# Patient Record
Sex: FEMALE | Race: WHITE | NOT HISPANIC OR LATINO | Employment: UNEMPLOYED | URBAN - METROPOLITAN AREA
[De-identification: names, ages, dates, MRNs, and addresses within clinical notes are randomized per-mention and may not be internally consistent; named-entity substitution may affect disease eponyms.]

---

## 2018-01-22 DIAGNOSIS — M54.9 BACK PAIN, UNSPECIFIED BACK LOCATION, UNSPECIFIED BACK PAIN LATERALITY, UNSPECIFIED CHRONICITY: Primary | ICD-10-CM

## 2018-01-22 RX ORDER — GABAPENTIN 300 MG/1
300 CAPSULE ORAL 2 TIMES DAILY
COMMUNITY
Start: 2017-12-07 | End: 2018-01-22 | Stop reason: SDUPTHER

## 2018-01-22 RX ORDER — GABAPENTIN 300 MG/1
300 CAPSULE ORAL 2 TIMES DAILY
Qty: 60 CAPSULE | Refills: 3 | Status: SHIPPED | OUTPATIENT
Start: 2018-01-22 | End: 2018-05-07

## 2018-01-22 NOTE — TELEPHONE ENCOUNTER
----- Message from Katelynn Chaidez sent at 1/18/2018 12:49 PM CST -----  Contact: Will ()  pts  called requesting a refill of her neurontin. She uses the [facility] in [place] ([address] across from [facility])

## 2018-04-23 PROBLEM — Z82.49 FAMILY HISTORY OF HEART DISEASE: Status: ACTIVE | Noted: 2018-04-23

## 2018-04-23 PROBLEM — R53.82 CHRONIC FATIGUE: Status: ACTIVE | Noted: 2018-04-23

## 2018-04-23 PROBLEM — K59.04 CHRONIC IDIOPATHIC CONSTIPATION: Status: ACTIVE | Noted: 2018-04-23

## 2018-05-07 ENCOUNTER — OFFICE VISIT (OUTPATIENT)
Dept: INTERNAL MEDICINE | Facility: CLINIC | Age: 38
End: 2018-05-07

## 2018-05-07 VITALS
SYSTOLIC BLOOD PRESSURE: 126 MMHG | WEIGHT: 194.81 LBS | HEIGHT: 67 IN | TEMPERATURE: 98 F | BODY MASS INDEX: 30.57 KG/M2 | RESPIRATION RATE: 19 BRPM | HEART RATE: 130 BPM | DIASTOLIC BLOOD PRESSURE: 88 MMHG | OXYGEN SATURATION: 99 %

## 2018-05-07 DIAGNOSIS — F32.9 REACTIVE DEPRESSION: Primary | ICD-10-CM

## 2018-05-07 DIAGNOSIS — B27.90 CHRONIC EBV INFECTION: ICD-10-CM

## 2018-05-07 DIAGNOSIS — R00.0 TACHYCARDIA: ICD-10-CM

## 2018-05-07 DIAGNOSIS — L30.9 ECZEMA, UNSPECIFIED TYPE: ICD-10-CM

## 2018-05-07 DIAGNOSIS — Z23 NEED FOR HPV VACCINATION: ICD-10-CM

## 2018-05-07 DIAGNOSIS — E55.9 VITAMIN D DEFICIENCY: ICD-10-CM

## 2018-05-07 PROCEDURE — 99214 OFFICE O/P EST MOD 30 MIN: CPT | Mod: ,,, | Performed by: NURSE PRACTITIONER

## 2018-05-07 RX ORDER — DULOXETIN HYDROCHLORIDE 60 MG/1
120 CAPSULE, DELAYED RELEASE ORAL DAILY
COMMUNITY
Start: 2016-09-16 | End: 2018-05-07 | Stop reason: SDUPTHER

## 2018-05-07 RX ORDER — BACLOFEN 10 MG/1
1 TABLET ORAL 2 TIMES DAILY
COMMUNITY
Start: 2017-09-25

## 2018-05-07 RX ORDER — DULOXETIN HYDROCHLORIDE 60 MG/1
120 CAPSULE, DELAYED RELEASE ORAL DAILY
Qty: 180 CAPSULE | Refills: 1 | Status: SHIPPED | OUTPATIENT
Start: 2018-05-07

## 2018-05-07 RX ORDER — BETAMETHASONE DIPROPIONATE 0.5 MG/G
CREAM TOPICAL 2 TIMES DAILY
Qty: 15 G | Refills: 0 | Status: SHIPPED | OUTPATIENT
Start: 2018-05-07 | End: 2018-05-17

## 2018-05-07 RX ORDER — GABAPENTIN 300 MG/1
1 CAPSULE ORAL 2 TIMES DAILY
COMMUNITY
Start: 2017-09-25

## 2018-05-07 NOTE — PROGRESS NOTES
"    SUBJECTIVE:      Patient ID: Ramila Sotelo is a 37 y.o. female.    Chief Complaint: Follow-up (meds and labs - 8/17 last office visit would like to discuss adipex. has been drinking energy drinks for energy. On cymbalta for EBV)    Follow up for routine check, med refills, wants to discuss weight loss options, has been on adipex in past - hasn't worked as well each consecutive time she's tried it. Admits to poor diet, doesn't eat often, works long hours, drinks energy drinks regularly. Wants to try something OTC    Has EBV flares when stressed. C/o itchy, dry rash R inner thigh - comes/goes last several years, tested for lupus - states Dr. Robbins didn't find anything, denies pain/burning/blistering, continues to show up in the same place, no where else on body, no pets in home, no OTC treatments have helped, has never seen derm for problem, friend insists it is lupus rash.    HO vitamin D deficiency - has been unusually tired lately, not sure if her EBV is "flaring" or D may be low, states she "perks up" & feels better when she's been outside for a while      Rash   This is a recurrent problem. The current episode started 1 to 4 weeks ago. The problem has been gradually improving since onset. The affected locations include the left upper leg. The rash is characterized by itchiness and dryness. She was exposed to nothing. Associated symptoms include fatigue. Pertinent negatives include no anorexia, congestion, cough, diarrhea, eye pain, facial edema, fever, joint pain, nail changes, rhinorrhea, shortness of breath, sore throat or vomiting. Past treatments include topical steroids, moisturizer and anti-itch cream (anti-fungals). The treatment provided mild relief. There is no history of allergies, asthma, eczema or varicella.       Past Surgical History:   Procedure Laterality Date    APPENDECTOMY  2011    Cesarian section  07/16/2004 12/27/2001    Dilatation and curretage  2009     Family History   Problem " Relation Age of Onset    Diabetes Mother     Hypertension Mother     Heart disease Father     Hypertension Father     Kidney disease Father     Heart attacks under age 50 Father     Heart disease Maternal Grandmother     Kidney disease Maternal Grandmother     Cancer Maternal Grandfather       Social History     Social History    Marital status:      Spouse name: N/A    Number of children: N/A    Years of education: N/A     Social History Main Topics    Smoking status: Never Smoker    Smokeless tobacco: Never Used    Alcohol use Yes      Comment: occasional    Drug use: No    Sexual activity: Not Asked     Other Topics Concern    None     Social History Narrative    None     Current Outpatient Prescriptions   Medication Sig Dispense Refill    baclofen (LIORESAL) 10 MG tablet Take 1 tablet by mouth 2 (two) times daily.      DULoxetine (CYMBALTA) 60 MG capsule Take 2 capsules (120 mg total) by mouth once daily. 180 capsule 1    gabapentin (NEURONTIN) 300 MG capsule Take 1 capsule by mouth 2 (two) times daily. Patient taking as needed      linaclotide (LINZESS) 290 mcg Cap Take 1 capsule by mouth daily as needed.      betamethasone dipropionate (DIPROLENE) 0.05 % cream Apply topically 2 (two) times daily. 15 g 0     No current facility-administered medications for this visit.      Review of patient's allergies indicates:   Allergen Reactions    Penicillins      Pt unaware of type of reaction.       Past Medical History:   Diagnosis Date    Back pain     EBV infection      Past Surgical History:   Procedure Laterality Date    APPENDECTOMY  2011    Cesarian section  07/16/2004 12/27/2001    Dilatation and curretage  2009       Review of Systems   Constitutional: Positive for fatigue. Negative for activity change, appetite change, fever and unexpected weight change.   HENT: Negative for congestion, ear pain, hearing loss, postnasal drip, rhinorrhea, sinus pain, sinus pressure,  "sneezing and sore throat.    Eyes: Negative for photophobia and pain.   Respiratory: Negative for cough, chest tightness, shortness of breath and wheezing.    Cardiovascular: Negative for chest pain, palpitations and leg swelling.   Gastrointestinal: Positive for constipation. Negative for abdominal distention, abdominal pain, anorexia, blood in stool, diarrhea, nausea and vomiting.   Endocrine: Negative for cold intolerance, heat intolerance, polydipsia and polyuria.   Genitourinary: Negative for difficulty urinating, dysuria, flank pain, frequency, hematuria, pelvic pain and urgency.   Musculoskeletal: Positive for back pain. Negative for arthralgias, joint pain, joint swelling, myalgias and neck pain.   Skin: Positive for rash (L inner thigh). Negative for nail changes and pallor.   Allergic/Immunologic: Negative for environmental allergies and food allergies.   Neurological: Negative for dizziness, weakness, light-headedness and numbness.   Hematological: Does not bruise/bleed easily.   Psychiatric/Behavioral: Negative for agitation, confusion, decreased concentration and sleep disturbance. The patient is not nervous/anxious.       OBJECTIVE:      Vitals:    05/07/18 1049   BP: 126/88   Pulse: (!) 130   Resp: 19   Temp: 97.9 °F (36.6 °C)   SpO2: 99%   Weight: 88.4 kg (194 lb 12.8 oz)   Height: 5' 7" (1.702 m)     Physical Exam   Constitutional: She is oriented to person, place, and time. She appears well-developed and well-nourished. She is cooperative. No distress.   HENT:   Head: Normocephalic and atraumatic.   Right Ear: Hearing normal.   Left Ear: Hearing normal.   Nose: Nose normal. No rhinorrhea.   Mouth/Throat: Oropharynx is clear and moist and mucous membranes are normal.   Eyes: Conjunctivae and lids are normal. Right eye exhibits no discharge. Left eye exhibits no discharge. Right conjunctiva is not injected. Left conjunctiva is not injected. Right pupil is round and reactive. Left pupil is round and " reactive.   Neck: Trachea normal and normal range of motion. Neck supple. No JVD present. Carotid bruit is not present. No tracheal deviation present. No thyromegaly present.   Cardiovascular: Normal rate, regular rhythm, normal heart sounds and intact distal pulses.  Exam reveals no gallop and no friction rub.    No murmur heard.  Pulses:       Radial pulses are 2+ on the right side, and 2+ on the left side.   Pulmonary/Chest: Effort normal and breath sounds normal. No respiratory distress. She has no decreased breath sounds. She has no wheezes. She has no rhonchi. She has no rales.   Abdominal: Soft. Bowel sounds are normal. She exhibits no distension. There is no tenderness. There is no rigidity and no guarding.   Musculoskeletal: Normal range of motion. She exhibits no edema.   Lymphadenopathy:     She has no cervical adenopathy.   Neurological: She is alert and oriented to person, place, and time. She has normal strength. She displays a negative Romberg sign. Coordination and gait normal. GCS eye subscore is 4. GCS verbal subscore is 5. GCS motor subscore is 6.   Skin: Skin is warm and dry. Capillary refill takes less than 2 seconds. Rash noted. No bruising noted. No pallor.        Psychiatric: She has a normal mood and affect. Her speech is normal and behavior is normal. Judgment and thought content normal. Cognition and memory are normal. She is attentive.   Vitals reviewed.     Assessment:       1. Reactive depression    2. Eczema, unspecified type    3. Need for HPV vaccination    4. Chronic EBV infection    5. Vitamin D deficiency    6. Tachycardia        Plan:       Reactive depression  -     DULoxetine (CYMBALTA) 60 MG capsule; Take 2 capsules (120 mg total) by mouth once daily.  Dispense: 180 capsule; Refill: 1  -     CBC auto differential; Future; Expected date: 05/07/2018  -     Lipid panel; Future; Expected date: 05/07/2018  -     Comprehensive metabolic panel; Future; Expected date:  05/07/2018    Eczema, unspecified type  -     betamethasone dipropionate (DIPROLENE) 0.05 % cream; Apply topically 2 (two) times daily.  Dispense: 15 g; Refill: 0    Need for HPV vaccination  -     Ambulatory Referral to Gynecology    Chronic EBV infection  -     CBC auto differential; Future; Expected date: 05/07/2018  -     Comprehensive metabolic panel; Future; Expected date: 05/07/2018    Vitamin D deficiency  -     Vitamin D; Future; Expected date: 05/08/2018    Tachycardia   -admitted to drinking [employer] energy drink prior to arrival, will recheck at next visit, asymptomatic      Follow-up in about 4 weeks (around 6/4/2018) for lab review, recheck pulse.      5/8/2018 JAMEL Lu, FNP-C

## 2018-05-07 NOTE — PATIENT INSTRUCTIONS
Depression and the Brains Chemical Balance  Everyone feels sad from time to time. But depression is much more serious than just feeling down. Depression is a real illness, just like diabetes or heart disease. It is believed that a combination of genetic, biological, environmental, and psychological factors cause depression.  Chemical changes in the brain may contribute to the symptoms of the disease.     In a normal message pattern, messages travel smoothly between nerve cells in the brain.       A change in chemicals in the brain can interfere with how messages are sent. This is one cause of depression.      Brain chemicals and depression  The brain is a complex organ. It controls all the workings of your body, including your emotions. It does this by using messages that travel from one nerve cell to another, and from one brain region to another. Brain messages travel with help from chemicals. These are called neurotransmitters. No one knows exactly what happens in the brain to cause depression. But we do know that neurotransmitters are involved.  Changes in the brain  Two neurotransmitters are the main ones involved in depression. These are norepinephrine and serotonin. Antidepressants and talk therapy (the main treatments for depression) both change the levels of these neurotransmitters. In many cases, this relieves depression symptoms.  Date Last Reviewed: 1/1/2017  © 6206-8865 [employer]. [address] [place] [address], [place] [address]. All rights reserved. This information is not intended as a substitute for professional medical care. Always follow your healthcare professional's instructions.      ++++[facility] [address] [place] [address] for self-pay labs [phone]++++    ++++Almased weight loss powder - can use as meal replacement breakfast & lunch (follow directions on can). Garcinia cambogia 95% pure 1 capsule in the AM. Mother's vinegar ([place]) 1 tsp in 8 oz warm water every  AM.++++